# Patient Record
Sex: FEMALE | URBAN - METROPOLITAN AREA
[De-identification: names, ages, dates, MRNs, and addresses within clinical notes are randomized per-mention and may not be internally consistent; named-entity substitution may affect disease eponyms.]

---

## 2020-06-19 ENCOUNTER — NEW PATIENT (OUTPATIENT)
Dept: URBAN - METROPOLITAN AREA CLINIC 24 | Facility: CLINIC | Age: 55
End: 2020-06-19
Payer: MEDICAID

## 2020-06-19 DIAGNOSIS — E11.9 TYPE 2 DIABETES MELLITUS WITHOUT COMPLICATIONS: Primary | ICD-10-CM

## 2020-06-19 DIAGNOSIS — H40.013 OPEN ANGLE WITH BORDERLINE FINDINGS, LOW RISK, BILATERAL: ICD-10-CM

## 2020-06-19 PROCEDURE — 92133 CPTRZD OPH DX IMG PST SGM ON: CPT | Performed by: OPTOMETRIST

## 2020-06-19 PROCEDURE — 92134 CPTRZ OPH DX IMG PST SGM RTA: CPT | Performed by: OPTOMETRIST

## 2020-06-19 PROCEDURE — 92004 COMPRE OPH EXAM NEW PT 1/>: CPT | Performed by: OPTOMETRIST

## 2020-06-19 ASSESSMENT — INTRAOCULAR PRESSURE
OS: 16
OD: 18

## 2020-06-19 ASSESSMENT — KERATOMETRY
OD: 45.48
OS: 45.52

## 2020-06-19 ASSESSMENT — VISUAL ACUITY
OS: 20/20
OD: 20/20

## 2021-06-18 ENCOUNTER — OFFICE VISIT (OUTPATIENT)
Dept: URBAN - METROPOLITAN AREA CLINIC 24 | Facility: CLINIC | Age: 56
End: 2021-06-18
Payer: MEDICAID

## 2021-06-18 DIAGNOSIS — Z79.4 LONG TERM (CURRENT) USE OF INSULIN: ICD-10-CM

## 2021-06-18 DIAGNOSIS — E11.3293 TYPE 2 DIAB WITH MILD NONP RTNOP WITHOUT MACULAR EDEMA, BI: Primary | ICD-10-CM

## 2021-06-18 DIAGNOSIS — H25.813 COMBINED FORMS OF AGE-RELATED CATARACT, BILATERAL: ICD-10-CM

## 2021-06-18 PROCEDURE — 92014 COMPRE OPH EXAM EST PT 1/>: CPT | Performed by: OPTOMETRIST

## 2021-06-18 PROCEDURE — 92133 CPTRZD OPH DX IMG PST SGM ON: CPT | Performed by: OPTOMETRIST

## 2021-06-18 PROCEDURE — 92134 CPTRZ OPH DX IMG PST SGM RTA: CPT | Performed by: OPTOMETRIST

## 2021-06-18 ASSESSMENT — VISUAL ACUITY
OS: 20/20
OD: 20/25

## 2021-06-18 ASSESSMENT — KERATOMETRY
OS: 45.67
OD: 45.58

## 2021-06-18 ASSESSMENT — INTRAOCULAR PRESSURE
OS: 18
OD: 18

## 2021-06-18 NOTE — IMPRESSION/PLAN
Impression: Combined forms of age-related cataract, bilateral: H25.813. Plan: No treatment currently recommended due to level of vision / lack of functional complaints. Patient will monitor vision changes and contact us with any decrease in vision, will re-evaluate cataract on return visit. 
-pt edu

## 2021-06-18 NOTE — IMPRESSION/PLAN
Impression: Type 2 diab with mild nonp rtnop without macular edema, bi: D06.3072. -- new dx June '21 Plan: Mild non-proliferative diabetic retinopathy, no signs of neovascularization noted. No treatment necessary at this time. Patient was instructed to monitor vision for sudden changes, told to call if changes noted. Discussed ocular and systemic benefits of blood sugar control.

## 2021-06-18 NOTE — IMPRESSION/PLAN
Impression: Open angle with borderline findings, low risk, bilateral
-- per CDR, abnormal rnfl oct
-- (+) fohx; mother Plan: Pt edu. Diagnostics demonstrated. Rec. full baseline glc diagnostic testing.

## 2021-10-15 ENCOUNTER — OFFICE VISIT (OUTPATIENT)
Dept: URBAN - METROPOLITAN AREA CLINIC 24 | Facility: CLINIC | Age: 56
End: 2021-10-15
Payer: MEDICAID

## 2021-10-15 PROCEDURE — 92012 INTRM OPH EXAM EST PATIENT: CPT | Performed by: OPTOMETRIST

## 2021-10-15 PROCEDURE — 92083 EXTENDED VISUAL FIELD XM: CPT | Performed by: OPTOMETRIST

## 2021-10-15 PROCEDURE — 92250 FUNDUS PHOTOGRAPHY W/I&R: CPT | Performed by: OPTOMETRIST

## 2021-10-15 PROCEDURE — 92020 GONIOSCOPY: CPT | Performed by: OPTOMETRIST

## 2021-10-15 PROCEDURE — 76514 ECHO EXAM OF EYE THICKNESS: CPT | Performed by: OPTOMETRIST

## 2021-10-15 ASSESSMENT — INTRAOCULAR PRESSURE
OD: 16
OS: 18

## 2021-10-15 NOTE — IMPRESSION/PLAN
Impression: Open angle with borderline findings, low risk, bilateral
-- per CDR, abnormal rnfl oct
-- Tmax OD: 16, OS: 18
-- Gonio: open to cbb, 1+ tm pigment ou 10/15/21
-- (+) fohx; mother -- pachs; OD: 573 OS: 554 Plan: Baseline HVF 24-2 (full), pachs (avg), disc photos (cupping), gonio (open to cbb ou) today Again good IOP Will continue to monitor w/o pharmacologic tx. Pt edu / stressed importance of continued observation.